# Patient Record
Sex: FEMALE | URBAN - METROPOLITAN AREA
[De-identification: names, ages, dates, MRNs, and addresses within clinical notes are randomized per-mention and may not be internally consistent; named-entity substitution may affect disease eponyms.]

---

## 2023-04-20 ENCOUNTER — APPOINTMENT (RX ONLY)
Dept: URBAN - METROPOLITAN AREA CLINIC 125 | Facility: CLINIC | Age: 38
Setting detail: DERMATOLOGY
End: 2023-04-20

## 2023-04-20 DIAGNOSIS — M25.50 PAIN IN UNSPECIFIED JOINT: ICD-10-CM | Status: INADEQUATELY CONTROLLED

## 2023-04-20 DIAGNOSIS — L40.0 PSORIASIS VULGARIS: ICD-10-CM

## 2023-04-20 PROCEDURE — ? COUNSELING

## 2023-04-20 PROCEDURE — ? CHRONOLOGY OF PRESENT ILLNESS

## 2023-04-20 PROCEDURE — 99202 OFFICE O/P NEW SF 15 MIN: CPT

## 2023-04-20 ASSESSMENT — PGA PSORIASIS: PGA PSORIASIS 2020: SEVERE

## 2023-04-20 ASSESSMENT — LOCATION SIMPLE DESCRIPTION DERM
LOCATION SIMPLE: LEFT HIP
LOCATION SIMPLE: RIGHT PATELLOFEMORAL
LOCATION SIMPLE: RIGHT LOWER BACK
LOCATION SIMPLE: RIGHT SACROILIAC
LOCATION SIMPLE: LEFT PREPATELLAR BURSA
LOCATION SIMPLE: LEFT SACROILIAC

## 2023-04-20 ASSESSMENT — LOCATION ZONE DERM
LOCATION ZONE: TRUNK
LOCATION ZONE: PELVIS
LOCATION ZONE: KNEE
LOCATION ZONE: HIP

## 2023-04-20 ASSESSMENT — LOCATION DETAILED DESCRIPTION DERM
LOCATION DETAILED: LEFT HIP JOINT
LOCATION DETAILED: RIGHT PATELLOFEMORAL JOINT
LOCATION DETAILED: RIGHT SUPERIOR MEDIAL MIDBACK
LOCATION DETAILED: LEFT PREPATELLAR BURSA
LOCATION DETAILED: RIGHT SACROILIAC JOINT
LOCATION DETAILED: LEFT SACROILIAC JOINT

## 2023-04-20 ASSESSMENT — ITCH NUMERIC RATING SCALE: HOW SEVERE IS YOUR ITCHING?: 5

## 2023-04-20 ASSESSMENT — BSA PSORIASIS: % BODY COVERED IN PSORIASIS: 13

## 2023-04-20 NOTE — PROCEDURE: COUNSELING
Detail Level: Generalized
Detail Level: Detailed
Patient Specific Counseling (Will Not Stick From Patient To Patient): Discussed importance of Rheumatology evaluation to r/o Psoriatic Arthritis, as this disease can destroy the joints.  Discussed Cimzia due to pregnancy.  Will refer to rheumatology for evaluation of joints for psoriatic arthritis when pt calls with in network preferred.rheumatologist.

## 2023-04-20 NOTE — PROCEDURE: CHRONOLOGY OF PRESENT ILLNESS
Detail Level: Zone
Chronology Of Present Illness: 4/20/23- 23+ year history of psoriasis of scalp, ears, elbows, knees, genitals. Treated with TCS, TCI without improvement. Success with Excimer and NBUVB. Avoiding topicals and systemic therapy at this time due to pregnancies.

## 2023-04-20 NOTE — HPI: RASH (PSORIASIS)
Do You Have A Family History Of Psoriasis?: no
How Severe Is Your Psoriasis?: moderate
Is This A New Presentation, Or A Follow-Up?: Rash
Additional History: Pt stated has not had good results with topical therapies, but excimer and NBUVB has been very successful for her. Currently pregnant and prefers to avoid topical therapies at this time. Planning more pregnancies.

## 2023-05-09 ENCOUNTER — APPOINTMENT (RX ONLY)
Dept: URBAN - METROPOLITAN AREA CLINIC 125 | Facility: CLINIC | Age: 38
Setting detail: DERMATOLOGY
End: 2023-05-09

## 2025-04-23 ENCOUNTER — APPOINTMENT (OUTPATIENT)
Dept: URBAN - METROPOLITAN AREA CLINIC 5 | Facility: CLINIC | Age: 40
Setting detail: DERMATOLOGY
End: 2025-04-23

## 2025-04-23 DIAGNOSIS — L40.0 PSORIASIS VULGARIS: ICD-10-CM

## 2025-04-23 PROCEDURE — ? PRESCRIPTION SAMPLES PROVIDED

## 2025-04-23 PROCEDURE — ? ADDITIONAL NOTES

## 2025-04-23 PROCEDURE — 99203 OFFICE O/P NEW LOW 30 MIN: CPT

## 2025-04-23 PROCEDURE — ? COUNSELING

## 2025-04-23 ASSESSMENT — ITCH NUMERIC RATING SCALE: HOW SEVERE IS YOUR ITCHING?: 5

## 2025-04-23 ASSESSMENT — BSA PSORIASIS: % BODY COVERED IN PSORIASIS: 65

## 2025-04-30 ENCOUNTER — RX ONLY (RX ONLY)
Age: 40
End: 2025-04-30

## 2025-04-30 RX ORDER — ROFLUMILAST 3 MG/G
CREAM TOPICAL
Qty: 60 | Refills: 4 | Status: ERX | COMMUNITY
Start: 2025-04-30

## 2025-05-02 ENCOUNTER — RX ONLY (RX ONLY)
Age: 40
End: 2025-05-02

## 2025-05-02 RX ORDER — ROFLUMILAST 3 MG/G
CREAM TOPICAL
Qty: 60 | Refills: 4 | Status: ERX

## 2025-05-06 ENCOUNTER — RX ONLY (RX ONLY)
Age: 40
End: 2025-05-06

## 2025-05-06 RX ORDER — ROFLUMILAST 3 MG/G
CREAM TOPICAL
Qty: 60 | Refills: 3 | Status: ERX